# Patient Record
Sex: MALE | Race: WHITE | URBAN - METROPOLITAN AREA
[De-identification: names, ages, dates, MRNs, and addresses within clinical notes are randomized per-mention and may not be internally consistent; named-entity substitution may affect disease eponyms.]

---

## 2017-10-18 ENCOUNTER — THERAPY VISIT (OUTPATIENT)
Dept: PHYSICAL THERAPY | Facility: CLINIC | Age: 22
End: 2017-10-18
Payer: COMMERCIAL

## 2017-10-18 DIAGNOSIS — M25.512 ACUTE PAIN OF LEFT SHOULDER: Primary | ICD-10-CM

## 2017-10-18 PROCEDURE — 97140 MANUAL THERAPY 1/> REGIONS: CPT | Mod: GP | Performed by: PHYSICAL THERAPIST

## 2017-10-18 PROCEDURE — 97161 PT EVAL LOW COMPLEX 20 MIN: CPT | Mod: GP | Performed by: PHYSICAL THERAPIST

## 2017-10-18 PROCEDURE — 97112 NEUROMUSCULAR REEDUCATION: CPT | Mod: GP | Performed by: PHYSICAL THERAPIST

## 2017-10-18 NOTE — PROGRESS NOTES
Subjective:    Patient is a 21 year old male presenting with rehab left shoulder hpi.   Padilla Hough is a 21 year old male with a left shoulder condition.  Condition occurred with:  Repetition/overuse.  Condition occurred: during recreation/sport.  This is a new condition  Pt presents to clinic with complaints of increased anterior L shoulder pain over the past 2 months with weight lifting. Pt has had similar pain in L shoulder 5 years ago after doing push-ups. Pt currently having most difficulty with reaching overhead, lifting, and pushing. Pt had MD appointment on 9/27/17 and referred to PT. .    Patient reports pain:  Anterior.  Radiates to: none.  Pain is described as aching and is intermittent and reported as 2/10 and 5/10.  Associated symptoms:  Loss of motion/stiffness and loss of strength. Pain is the same all the time.  Symptoms are exacerbated by using arm at shoulder level, using arm behind back, carrying, lifting and certain positions and relieved by activity/movement.  Since onset symptoms are unchanged.  Special tests:  X-ray.  Previous treatment: none.    General health as reported by patient is excellent.                  Barriers include:  None as reported by the patient.    Red flags:  None as reported by the patient.                        Objective:    Standing Alignment:    Cervical/Thoracic:  Forward head  Shoulder/UE:  Rounded shoulders and scapular winging L                  Flexibility/Screens:     Upper Extremity:    Decreased left upper extremity flexibility at:  Pectoralis Major and Pectoralis Minor    Decreased right upper extremity flexibility present at:  Pectoralis Major                           Shoulder Evaluation:  ROM:  AROM:    Flexion:  Left:  150    Right:  155    Abduction:  Left: 150   Right:  155      External Rotation:  Left:  80    Right:  80            Extension/Internal Rotation:  Left:  T7    Right:  T7          Strength:    Flexion: Left:4+/5    Pain: +    Right: 5/5      Pain:     Abduction:  Left: 4+/5   Pain:+    Right: 5/5     Pain:    Internal Rotation:  Left:5/5     Pain:    Right: 5/5     Pain:  External Rotation:   Left:4+/5     Pain:   Right:5/5     Pain:        Elbow Flexion:  Left:5/5     Pain:    Right:5/5     Pain:  Elbow Extension:  Left:5/5     Pain:    Right:5/5     Pain:    Special Tests:    Left shoulder positive for the following special tests:  Impingement  Left shoulder negative for the following special tests:  Labral    Right shoulder negative for the following special tests:Impingement  Palpation:    Left shoulder tenderness present at:  Levator; Upper Trap and Bicipital Groove                                       [facility]    [place] [facility] Initial Evaluation    Assessment/Plan:      Patient is a 21 year old male with left side shoulder complaints.    Patient has the following significant findings with corresponding treatment plan.                Diagnosis 1:  L shoulder pain  Pain -  hot/cold therapy, manual therapy, self management, education and home program  Decreased ROM/flexibility - manual therapy, therapeutic exercise, therapeutic activity and home program  Decreased strength - therapeutic exercise, therapeutic activities and home program  Impaired muscle performance - neuro re-education and home program  Decreased function - therapeutic activities and home program  Impaired posture - neuro re-education, therapeutic activities and home program    Therapy Evaluation Codes:   1) History comprised of:   Personal factors that impact the plan of care:      None.    Comorbidity factors that impact the plan of care are:      None.     Medications impacting care: None.  2) Examination of Body Systems comprised of:   Body structures and functions that impact the plan of care:      Shoulder.   Activity limitations that impact the plan of care are:      Lifting and Laying down.  3) Clinical presentation characteristics  are:   Stable/Uncomplicated.  4) Decision-Making    Low complexity using standardized patient assessment instrument and/or measureable assessment of functional outcome.  Cumulative Therapy Evaluation is: Low complexity.    Previous and current functional limitations:  (See Goal Flow Sheet for this information)    Short term and Long term goals: (See Goal Flow Sheet for this information)     Communication ability:  Patient appears to be able to clearly communicate and understand verbal and written communication and follow directions correctly.  Treatment Explanation - The following has been discussed with the patient:   RX ordered/plan of care  Anticipated outcomes  Possible risks and side effects  This patient would benefit from PT intervention to resume normal activities.   Rehab potential is good.    Frequency:  1 X week, once daily  Duration:  for 6 weeks  Discharge Plan:  Achieve all LTG.  Independent in home treatment program.  Return to previous functional level by discharge.  Reach maximal therapeutic benefit.    Please refer to the daily flowsheet for treatment today, total treatment time and time spent performing 1:1 timed codes.

## 2017-10-18 NOTE — LETTER
[facility]  [address] [address] [place] [id number]  [phone]    2017    Re: Padilla Hough   :   1995  MRN:  [id number]   REFERRING PHYSICIAN:   Chano Hillman    [facility]    Date of Initial Evaluation:  10/18/2017  Visits:  Rxs Used: 1  Reason for Referral:  Acute pain of left shoulder    EVALUATION SUMMARY    Subjective:    Patient is a 21 year old male presenting with rehab left shoulder hpi.   Padilla Hough is a 21 year old male with a left shoulder condition.  Condition occurred with:  Repetition/overuse.  Condition occurred: during recreation/sport.  This is a new condition  Pt presents to clinic with complaints of increased anterior L shoulder pain over the past 2 months with weight lifting. Pt has had similar pain in L shoulder 5 years ago after doing push-ups. Pt currently having most difficulty with reaching overhead, lifting, and pushing. Pt had MD appointment on 17 and referred to PT. .    Patient reports pain:  Anterior.  Radiates to: none.  Pain is described as aching and is intermittent and reported as 2/10 and 5/10.  Associated symptoms:  Loss of motion/stiffness and loss of strength. Pain is the same all the time.  Symptoms are exacerbated by using arm at shoulder level, using arm behind back, carrying, lifting and certain positions and relieved by activity/movement.  Since onset symptoms are unchanged.  Special tests:  X-ray.  Previous treatment: none.    General health as reported by patient is excellent.  Pertinent medical history includes:  None.  Medical allergies: yes (amoxicillin).  Other surgeries include:  None reported.  Current medications:  None as reported by patient.  Current occupation is student.                 Barriers include:  None as reported by the patient.  Red flags:  None as reported by the patient.    Objective:    Standing Alignment:    Cervical/Thoracic:  Forward head  Shoulder/UE:  Rounded  shoulders and scapular winging L  Flexibility/Screens:   Upper Extremity:    Decreased left upper extremity flexibility at:  Pectoralis Major and Pectoralis Minor  Decreased right upper extremity flexibility present at:  Pectoralis Major       Shoulder Evaluation:  ROM:  AROM:    Flexion:  Left:  150    Right:  155  Abduction:  Left: 150   Right:  155  External Rotation:  Left:  80    Right:  80  Extension/Internal Rotation:  Left:  T7    Right:  T7    Strength:    Flexion: Left:4+/5    Pain: +    Right: 5/5     Pain:   Abduction:  Left: 4+/5   Pain:+    Right: 5/5     Pain:  Internal Rotation:  Left:5/5     Pain:    Right: 5/5     Pain:  External Rotation:   Left:4+/5     Pain:   Right:5/5     Pain:    Elbow Flexion:  Left:5/5     Pain:    Right:5/5     Pain:  Elbow Extension:  Left:5/5     Pain:    Right:5/5     Pain:  Special Tests:    Left shoulder positive for the following special tests:  Impingement  Left shoulder negative for the following special tests:  Labral  Right shoulder negative for the following special tests:Impingement  Palpation:    Left shoulder tenderness present at:  Levator; Upper Trap and Bicipital Groove    Assessment/Plan:      Patient is a 21 year old male with left side shoulder complaints.    Patient has the following significant findings with corresponding treatment plan.                Diagnosis 1:  L shoulder pain  Pain -  hot/cold therapy, manual therapy, self management, education and home program  Decreased ROM/flexibility - manual therapy, therapeutic exercise, therapeutic activity and home program  Decreased strength - therapeutic exercise, therapeutic activities and home program  Impaired muscle performance - neuro re-education and home program  Decreased function - therapeutic activities and home program  Impaired posture - neuro re-education, therapeutic activities and home program    Therapy Evaluation Codes:   1) History comprised of:   Personal factors that impact the plan  of care:      None.    Comorbidity factors that impact the plan of care are:      None.     Medications impacting care: None.  2) Examination of Body Systems comprised of:   Body structures and functions that impact the plan of care:      Shoulder.   Activity limitations that impact the plan of care are:      Lifting and Laying down.  3) Clinical presentation characteristics are:   Stable/Uncomplicated.  4) Decision-Making    Low complexity using standardized patient assessment instrument and/or measureable assessment of functional outcome.  Cumulative Therapy Evaluation is: Low complexity.    Previous and current functional limitations:  (See Goal Flow Sheet for this information)    Short term and Long term goals: (See Goal Flow Sheet for this information)     Communication ability:  Patient appears to be able to clearly communicate and understand verbal and written communication and follow directions correctly.  Treatment Explanation - The following has been discussed with the patient:   RX ordered/plan of care  Anticipated outcomes  Possible risks and side effects  This patient would benefit from PT intervention to resume normal activities.   Rehab potential is good.    Frequency:  1 X week, once daily  Duration:  for 6 weeks  Discharge Plan:  Achieve all LTG.  Independent in home treatment program.  Return to previous functional level by discharge.  Reach maximal therapeutic benefit.    Thank you for your referral.    INQUIRIES  Therapist: Juan Padilla DPT  [address] [address]  Phone: [phone]  Fax: [phone]

## 2017-10-25 ENCOUNTER — THERAPY VISIT (OUTPATIENT)
Dept: PHYSICAL THERAPY | Facility: CLINIC | Age: 22
End: 2017-10-25
Payer: COMMERCIAL

## 2017-10-25 DIAGNOSIS — M25.512 ACUTE PAIN OF LEFT SHOULDER: ICD-10-CM

## 2017-10-25 PROCEDURE — 97110 THERAPEUTIC EXERCISES: CPT | Mod: GP | Performed by: PHYSICAL THERAPIST

## 2017-10-25 PROCEDURE — 97140 MANUAL THERAPY 1/> REGIONS: CPT | Mod: GP | Performed by: PHYSICAL THERAPIST

## 2017-10-25 PROCEDURE — 97112 NEUROMUSCULAR REEDUCATION: CPT | Mod: GP | Performed by: PHYSICAL THERAPIST

## 2017-11-01 ENCOUNTER — THERAPY VISIT (OUTPATIENT)
Dept: PHYSICAL THERAPY | Facility: CLINIC | Age: 22
End: 2017-11-01
Payer: COMMERCIAL

## 2017-11-01 DIAGNOSIS — M25.512 ACUTE PAIN OF LEFT SHOULDER: ICD-10-CM

## 2017-11-01 PROCEDURE — 97140 MANUAL THERAPY 1/> REGIONS: CPT | Mod: GP | Performed by: PHYSICAL THERAPIST

## 2017-11-01 PROCEDURE — 97112 NEUROMUSCULAR REEDUCATION: CPT | Mod: GP | Performed by: PHYSICAL THERAPIST

## 2017-11-01 PROCEDURE — 97110 THERAPEUTIC EXERCISES: CPT | Mod: GP | Performed by: PHYSICAL THERAPIST

## 2017-11-01 NOTE — PROGRESS NOTES
Subjective:    HPI                    Objective:    System    Physical Exam    General     ROS    Assessment/Plan:      PROGRESS  REPORT    Progress reporting period is from 10/18/17 to 11/1/17. Progress report on 11/1/17 equal to discharge summary.     SUBJECTIVE  Subjective: Padilla noticing less overall pain and improved stability. Reports overall functioning at 80-90%. Has been working hard on HEP and will continue independently at this time.        Initial Pain level: 5/10   Changes in function: Yes, see goal flow sheet for change in function   Adverse reactions: None;   ,         OBJECTIVE  Objective: L shoulder AROM: flexion 155 deg -pain, abduction 155 deg -pain, ER 80 deg, EADIR T10; L shoulder strength: flexion 5-/5, abduction 5-/5, IR 5/5, ER 5-/5      ASSESSMENT/PLAN  Updated problem list and treatment plan: Diagnosis 1:  L shoulder pain, impingement syndrome  Pain -  hot/cold therapy, manual therapy, self management, education and home program  Decreased ROM/flexibility - manual therapy, therapeutic exercise, therapeutic activity and home program  Decreased strength - therapeutic exercise, therapeutic activities and home program  Impaired muscle performance - neuro re-education and home program  Decreased function - therapeutic activities and home program  Impaired posture - neuro re-education, therapeutic activities and home program  STG/LTGs have been met or progress has been made towards goals:  Yes (See Goal flow sheet completed today.)  Assessment of Progress: The patient's condition is improving.  Self Management Plans:  Patient has been instructed in a home treatment program.  Patient is independent in a home treatment program.  Patient  has been instructed in self management of symptoms.  Patient is independent in self management of symptoms.  I have re-evaluated this patient and find that the nature, scope, duration and intensity of the therapy is appropriate for the medical condition of the  patient.  Padilla continues to require the following intervention to meet STG and LTG's: PT intervention is no longer required to meet STG/LTG.  We will discharge this patient from PT.    Recommendations:  This patient is ready to be discharged from therapy and continue their home treatment program.    Please refer to the daily flowsheet for treatment today, total treatment time and time spent performing 1:1 timed codes.

## 2017-11-01 NOTE — LETTER
[facility]  [address] [address] [place] [id number]  [phone]    2017    Re: Padilla Hough   :   1995  MRN:  [id number]   REFERRING PHYSICIAN:   Chano Hillman    [facility]    Date of Initial Evaluation:  10/18/2017  Visits:  Rxs Used: 3  Reason for Referral:  Acute pain of left shoulder    PROGRESS  REPORT  Progress reporting period is from 10/18/17 to 17.     SUBJECTIVE  Subjective: Padilla noticing less overall pain and improved stability. Reports overall functioning at 80-90%. Has been working hard on HEP and will continue independently at this time.  Initial Pain level: 5/10.  Changes in function: Yes, see goal flow sheet for change in function.  Adverse reactions: None.    OBJECTIVE  Objective: L shoulder AROM: flexion 155 deg -pain, abduction 155 deg -pain, ER 80 deg, EADIR T10; L shoulder strength: flexion 5-/5, abduction 5-/5, IR 5/5, ER 5-/5      ASSESSMENT/PLAN  Updated problem list and treatment plan: Diagnosis 1:  L shoulder pain, impingement syndrome  Pain -  hot/cold therapy, manual therapy, self management, education and home program  Decreased ROM/flexibility - manual therapy, therapeutic exercise, therapeutic activity and home program  Decreased strength - therapeutic exercise, therapeutic activities and home program  Impaired muscle performance - neuro re-education and home program  Decreased function - therapeutic activities and home program  Impaired posture - neuro re-education, therapeutic activities and home program  STG/LTGs have been met or progress has been made towards goals:  Yes (See Goal flow sheet completed today.)  Assessment of Progress: The patient's condition is improving.  Self Management Plans:  Patient has been instructed in a home treatment program.  Patient is independent in a home treatment program.  Patient  has been instructed in self management of symptoms.  Patient is independent in self  management of symptoms.  I have re-evaluated this patient and find that the nature, scope, duration and intensity of the therapy is appropriate for the medical condition of the patient.  Padilla continues to require the following intervention to meet STG and LTG's: PT intervention is no longer required to meet STG/LTG.  We will discharge this patient from PT.    Recommendations:  This patient is ready to be discharged from therapy and continue their home treatment program.    Thank you for your referral.    INQUIRIES  Therapist: Lino Padilla DPT  [address] [address]  Phone: [phone]  Fax: [phone]

## 2017-12-05 PROBLEM — M25.512 ACUTE PAIN OF LEFT SHOULDER: Status: RESOLVED | Noted: 2017-10-18 | Resolved: 2017-12-05

## 2018-05-30 ENCOUNTER — THERAPY VISIT (OUTPATIENT)
Dept: PHYSICAL THERAPY | Facility: CLINIC | Age: 23
End: 2018-05-30
Payer: COMMERCIAL

## 2018-05-30 DIAGNOSIS — M25.512 SHOULDER PAIN, LEFT: Primary | ICD-10-CM

## 2018-05-30 DIAGNOSIS — G54.0 THORACIC OUTLET SYNDROME: ICD-10-CM

## 2018-05-30 PROCEDURE — 97161 PT EVAL LOW COMPLEX 20 MIN: CPT | Mod: GP | Performed by: PHYSICAL THERAPIST

## 2018-05-30 PROCEDURE — 97110 THERAPEUTIC EXERCISES: CPT | Mod: GP | Performed by: PHYSICAL THERAPIST

## 2018-05-30 PROCEDURE — 97140 MANUAL THERAPY 1/> REGIONS: CPT | Mod: GP | Performed by: PHYSICAL THERAPIST

## 2018-05-30 NOTE — PROGRESS NOTES
[facility] Initial Evaluation  Subjective:  Patient is a 22 year old male presenting with rehab left shoulder hpi.   Padilla Hough is a 22 year old male with a left shoulder condition.  Condition occurred with:  Unknown cause.  Condition occurred: for unknown reasons.  This is a new condition  Pt presents to clinic with complaints of L shoulder pain and L hand numbness/tingling over the past 3 months. Pt had previously been seen in PT for L shoulder pain/instability in October 2017. Pt had been having increased pain 2 months ago and began stretches previously shown in PT. Pt noticed increased numbness in L hand possibly related to overstretching. Pt now experiencing numbness with prolonged sitting/computer work. Pt had MD appointment on 5/9/18 and referred to PT. .    Patient reports pain:  Anterior.  Radiates to:  Elbow and hand.  Pain is described as aching and sharp and is intermittent and reported as 7/10.  Associated symptoms:  Loss of motion/stiffness and numbness. Pain is worse during the day.  Symptoms are exacerbated by using arm at shoulder level and using arm behind back and relieved by activity/movement.  Since onset symptoms are unchanged.  Special testing: none.  Previous treatment: none.    General health as reported by patient is good.                  Barriers include:  None as reported by the patient.    Red flags:  None as reported by the patient.                        Objective:  Standing Alignment:    Cervical/Thoracic:  Forward head  Shoulder/UE:  Rounded shoulders                                  Cervical/Thoracic Evaluation  Cervical AROM: normal                       Cervical Stability/Joint Clearing:    Left positive at:1st Rib and TOS Screen    Spinal Segmental Conclusions:    Level:  Hypo at T1, C7, T6 and T7             Shoulder Evaluation:  ROM:  AROM:  normal                                                                             General      ROS    Assessment/Plan:    Patient is a 22 year old male with left side shoulder complaints.    Patient has the following significant findings with corresponding treatment plan.                Diagnosis 1:  L shoulder pain, L hand numbness Pain -  hot/cold therapy, manual therapy, self management, education and home program  Decreased ROM/flexibility - manual therapy, therapeutic exercise, therapeutic activity and home program  Decreased strength - therapeutic exercise, therapeutic activities and home program  Impaired muscle performance - neuro re-education and home program  Decreased function - therapeutic activities and home program  Impaired posture - neuro re-education, therapeutic activities and home program    Therapy Evaluation Codes:   1) History comprised of:   Personal factors that impact the plan of care:      None.    Comorbidity factors that impact the plan of care are:      None.     Medications impacting care: None.  2) Examination of Body Systems comprised of:   Body structures and functions that impact the plan of care:      Hand and Shoulder.   Activity limitations that impact the plan of care are:      Lifting and Reading/Computer work.  3) Clinical presentation characteristics are:   Stable/Uncomplicated.  4) Decision-Making    Low complexity using standardized patient assessment instrument and/or measureable assessment of functional outcome.  Cumulative Therapy Evaluation is: Low complexity.    Previous and current functional limitations:  (See Goal Flow Sheet for this information)    Short term and Long term goals: (See Goal Flow Sheet for this information)     Communication ability:  Patient appears to be able to clearly communicate and understand verbal and written communication and follow directions correctly.  Treatment Explanation - The following has been discussed with the patient:   RX ordered/plan of care  Anticipated outcomes  Possible risks and side effects  This patient would  benefit from PT intervention to resume normal activities.   Rehab potential is good.    Frequency:  1 X week, once daily  Duration:  for 6 weeks  Discharge Plan:  Achieve all LTG.  Independent in home treatment program.  Return to previous functional level by discharge.  Reach maximal therapeutic benefit.    Please refer to the daily flowsheet for treatment today, total treatment time and time spent performing 1:1 timed codes.     Initial evaluation equal to discharge summary as pt has not returned for follow up appointments

## 2018-05-30 NOTE — LETTER
[facility]  [address] [address] [place] [id number]  [phone]    May 31, 2018    Re: Padilla Hough   :   1995  MRN:  [id number]   REFERRING PHYSICIAN:   Janes Hoyt    [facility]    Date of Initial Evaluation:  2018  Visits:  Rxs Used: 1  Reason for Referral:     Shoulder pain, left  Thoracic outlet syndrome    EVALUATION SUMMARY    [facility] Initial Evaluation    Subjective:  Patient is a 22 year old male presenting with rehab left shoulder hpi.   Padilla Hough is a 22 year old male with a left shoulder condition.  Condition occurred with:  Unknown cause.  Condition occurred: for unknown reasons.  This is a new condition  Pt presents to clinic with complaints of L shoulder pain and L hand numbness/tingling over the past 3 months. Pt had previously been seen in PT for L shoulder pain/instability in 2017. Pt had been having increased pain 2 months ago and began stretches previously shown in PT. Pt noticed increased numbness in L hand possibly related to overstretching. Pt now experiencing numbness with prolonged sitting/computer work. Pt had MD appointment on 18 and referred to PT. .    Patient reports pain:  Anterior.  Radiates to:  Elbow and hand.  Pain is described as aching and sharp and is intermittent and reported as 7/10.  Associated symptoms:  Loss of motion/stiffness and numbness. Pain is worse during the day.  Symptoms are exacerbated by using arm at shoulder level and using arm behind back and relieved by activity/movement.  Since onset symptoms are unchanged.  Special testing: none.  Previous treatment: none.    General health as reported by patient is good.                Barriers include:  None as reported by the patient.  Red flags:  None as reported by the patient.  General health as reported by patient is excellent.          Current occupation is .     Objective:  Standing  Alignment:    Cervical/Thoracic:  Forward head  Shoulder/UE:  Rounded shoulders  Cervical/Thoracic Evaluation  Cervical AROM: normal     Cervical Stability/Joint Clearing:    Left positive at:1st Rib and TOS Screen  Spinal Segmental Conclusions:    Level:  Hypo at T1, C7, T6 and T7  Re: Padilla Hough   :   1995       Shoulder Evaluation:  ROM:  AROM:  normal    Assessment/Plan:    Patient is a 22 year old male with left side shoulder complaints.    Patient has the following significant findings with corresponding treatment plan.                Diagnosis 1:  L shoulder pain, L hand numbness Pain -  hot/cold therapy, manual therapy, self management, education and home program  Decreased ROM/flexibility - manual therapy, therapeutic exercise, therapeutic activity and home program  Decreased strength - therapeutic exercise, therapeutic activities and home program  Impaired muscle performance - neuro re-education and home program  Decreased function - therapeutic activities and home program  Impaired posture - neuro re-education, therapeutic activities and home program  Therapy Evaluation Codes:   1) History comprised of:   Personal factors that impact the plan of care:      None.    Comorbidity factors that impact the plan of care are:      None.     Medications impacting care: None.  2) Examination of Body Systems comprised of:   Body structures and functions that impact the plan of care:      Hand and Shoulder.   Activity limitations that impact the plan of care are:      Lifting and Reading/Computer work.  3) Clinical presentation characteristics are:   Stable/Uncomplicated.  4) Decision-Making    Low complexity using standardized patient assessment instrument and/or measureable assessment of functional outcome.  Cumulative Therapy Evaluation is: Low complexity.  Previous and current functional limitations:  (See Goal Flow Sheet for this information)    Short term and Long term goals: (See Goal Flow Sheet for  this information)   Communication ability:  Patient appears to be able to clearly communicate and understand verbal and written communication and follow directions correctly.  Treatment Explanation - The following has been discussed with the patient:   RX ordered/plan of care  Anticipated outcomes  Possible risks and side effects  This patient would benefit from PT intervention to resume normal activities.   Rehab potential is good.    Frequency:  1 X week, once daily  Duration:  for 6 weeks  Discharge Plan:  Achieve all LTG.  Independent in home treatment program.  Return to previous functional level by discharge.  Reach maximal therapeutic benefit.                       Thank you for your referral.    INQUIRIES  Therapist: Juan Padilla DPT  [address] [address]  Phone: [phone]  Fax: [phone]

## 2018-05-31 NOTE — PROGRESS NOTES
[facility] Initial Evaluation  Subjective:  Patient is a 22 year old male presenting with rehab left ankle/foot hpi.                                    General health as reported by patient is excellent.          Current occupation is .                                    Objective:  System    Physical Exam    General     ROS    Assessment/Plan:

## 2018-08-14 PROBLEM — M25.512 SHOULDER PAIN, LEFT: Status: RESOLVED | Noted: 2018-05-30 | Resolved: 2018-08-14

## 2018-08-14 PROBLEM — G54.0 THORACIC OUTLET SYNDROME: Status: RESOLVED | Noted: 2018-05-30 | Resolved: 2018-08-14
